# Patient Record
(demographics unavailable — no encounter records)

---

## 2025-03-23 NOTE — PLAN
[FreeTextEntry1] : Normal Exam , but complaint c/w genitourinary syndrome of Menopause  -Pap done  -Breast self exam reviewed  -Gc Ct sent  -Will Rx estradiol vaginally for GUSM, urine cx sent  -exercise, Vit D, Ca+  -return visit PRN or 1 year

## 2025-03-23 NOTE — PHYSICAL EXAM
[Chaperone Present] : A chaperone was present in the examining room during all aspects of the physical examination [Appropriately responsive] : appropriately responsive [Alert] : alert [No Acute Distress] : no acute distress [No Lymphadenopathy] : no lymphadenopathy [Soft] : soft [Non-tender] : non-tender [Non-distended] : non-distended [No HSM] : No HSM [No Lesions] : no lesions [No Mass] : no mass [Oriented x3] : oriented x3 [Examination Of The Breasts] : a normal appearance [No Masses] : no breast masses were palpable [Vulvar Atrophy] : vulvar atrophy [Labia Majora] : normal [Labia Minora] : normal [Normal] : normal [Uterine Adnexae] : normal [No Tenderness] : no tenderness [Nl Sphincter Tone] : normal sphincter tone [FreeTextEntry1] : mild  [FreeTextEntry9] : guaiac negative

## 2025-03-23 NOTE — HISTORY OF PRESENT ILLNESS
[FreeTextEntry1] : Pt is a P1 , LMP >10yrs, here for check -up. No abn bleeding pain. or d/c. Does has sxs of vaginal dryness and itching intermittent, huas used topical estrogen in past w/ good results.   Med/Surg Hx -hypercholesteremia , osteoporosis  Nsd x 1, last Pap > 3yrs. 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